# Patient Record
Sex: FEMALE | Race: WHITE | NOT HISPANIC OR LATINO | Employment: PART TIME | URBAN - NONMETROPOLITAN AREA
[De-identification: names, ages, dates, MRNs, and addresses within clinical notes are randomized per-mention and may not be internally consistent; named-entity substitution may affect disease eponyms.]

---

## 2024-06-26 ENCOUNTER — OFFICE VISIT (OUTPATIENT)
Dept: URGENT CARE | Facility: CLINIC | Age: 29
End: 2024-06-26
Payer: COMMERCIAL

## 2024-06-26 VITALS
DIASTOLIC BLOOD PRESSURE: 67 MMHG | RESPIRATION RATE: 18 BRPM | TEMPERATURE: 97.8 F | SYSTOLIC BLOOD PRESSURE: 99 MMHG | OXYGEN SATURATION: 95 % | HEART RATE: 78 BPM

## 2024-06-26 DIAGNOSIS — B34.9 VIRAL ILLNESS: Primary | ICD-10-CM

## 2024-06-26 PROCEDURE — 99203 OFFICE O/P NEW LOW 30 MIN: CPT | Performed by: PHYSICIAN ASSISTANT

## 2024-06-26 RX ORDER — METRONIDAZOLE 500 MG/1
500 TABLET ORAL 2 TIMES DAILY
COMMUNITY
Start: 2024-06-21 | End: 2024-06-28

## 2024-06-26 NOTE — PROGRESS NOTES
[facility]        NAME: Chalo Myers is a 28 y.o. female  : 1995    MRN: [id number]  DATE: 2024  TIME: 8:53 AM    Assessment and Plan   Viral illness [B34.9]  1. Viral illness              Patient Instructions     Tylenol or Ibuprofen as needed for fever or pain  Drink plenty of fluids  Over the Counter cold medication to control symptoms  If symptoms fail to improve follow up with PCP  If symptoms worsen have yourself rechecked    Follow up with PCP in 3-5 days.  Proceed to  ER if symptoms worsen.    If tests have been performed at [facility], our office will contact you with results if changes need to be made to the care plan discussed with you at the visit.  You can review your full results on [facility].    Chief Complaint     Chief Complaint   Patient presents with   • Facial Pain     With cough and sore throat onset  3 days ago on flagyl for uti          History of Present Illness       Patient presents with a 3 day history of runny, stuffy nose, sore throat, cough, diarrhea and headaches. Patient denies fever, chills., nausea, vomiting or body aches.         Review of Systems   Review of Systems   Constitutional:  Negative for chills and fever.   HENT:  Positive for congestion, rhinorrhea and sore throat.    Respiratory:  Positive for cough.    Gastrointestinal:  Positive for diarrhea. Negative for nausea and vomiting.   Musculoskeletal:  Negative for myalgias.   Neurological:  Positive for headaches.         Current Medications       Current Outpatient Medications:   •  metroNIDAZOLE (FLAGYL) 500 mg tablet, Take 500 mg by mouth 2 (two) times a day, Disp: , Rfl:     Current Allergies     Allergies as of 2024   • (Not on File)            The following portions of the patient's history were reviewed and updated as appropriate: allergies, current medications, past family history, past medical history, past social history, past surgical history and problem list.     No  past medical history on file.    No past surgical history on file.    No family history on file.      Medications have been verified.        Objective   BP 99/67   Pulse 78   Temp 97.8 °F (36.6 °C)   Resp 18   SpO2 95%   No LMP recorded.       Physical Exam     Physical Exam  Vitals and nursing note reviewed.   Constitutional:       Appearance: Normal appearance.   HENT:      Head: Normocephalic and atraumatic.      Right Ear: Tympanic membrane normal.      Left Ear: Tympanic membrane normal.      Mouth/Throat:      Mouth: Mucous membranes are moist.      Pharynx: Oropharynx is clear.   Eyes:      Conjunctiva/sclera: Conjunctivae normal.   Cardiovascular:      Rate and Rhythm: Normal rate and regular rhythm.      Heart sounds: Normal heart sounds.   Pulmonary:      Effort: Pulmonary effort is normal.      Breath sounds: Normal breath sounds.   Musculoskeletal:      Cervical back: Neck supple.   Skin:     General: Skin is warm.   Neurological:      Mental Status: She is alert.